# Patient Record
Sex: FEMALE | Race: OTHER | URBAN - METROPOLITAN AREA
[De-identification: names, ages, dates, MRNs, and addresses within clinical notes are randomized per-mention and may not be internally consistent; named-entity substitution may affect disease eponyms.]

---

## 2018-10-07 ENCOUNTER — OFFICE VISIT (OUTPATIENT)
Dept: FAMILY MEDICINE CLINIC | Facility: CLINIC | Age: 43
End: 2018-10-07
Payer: COMMERCIAL

## 2018-10-07 VITALS
HEART RATE: 88 BPM | TEMPERATURE: 98 F | RESPIRATION RATE: 16 BRPM | SYSTOLIC BLOOD PRESSURE: 130 MMHG | DIASTOLIC BLOOD PRESSURE: 80 MMHG | OXYGEN SATURATION: 98 %

## 2018-10-07 DIAGNOSIS — Z23 NEEDS FLU SHOT: ICD-10-CM

## 2018-10-07 DIAGNOSIS — R12 HEARTBURN: Primary | ICD-10-CM

## 2018-10-07 PROCEDURE — 90471 IMMUNIZATION ADMIN: CPT | Performed by: NURSE PRACTITIONER

## 2018-10-07 PROCEDURE — 99202 OFFICE O/P NEW SF 15 MIN: CPT | Performed by: NURSE PRACTITIONER

## 2018-10-07 PROCEDURE — 90686 IIV4 VACC NO PRSV 0.5 ML IM: CPT | Performed by: NURSE PRACTITIONER

## 2018-10-07 NOTE — PROGRESS NOTES
CHIEF COMPLAINT:   Patient presents with:  Flu  Heartburn      HPI:   Johnnie Christy is a 44year old female who presents for complaints of heartburn. Symptoms have been present for several months on and off  months.   Frequency: on and often more often aft ASSESSMENT AND PLAN:   ASSESSMENT:  Heartburn  (primary encounter diagnosis)  Needs flu shot    PLAN: Proper diet discussed. To ED for moderate to severe abdominal pain, dehydration, blood in stool, or new onset of fever.       Discussed heartburn and av · Limit or avoid fatty, fried, and spicy foods, as well as coffee, chocolate, mint, and foods with high acid content such as tomatoes and citrus fruit and juices (orange, grapefruit, lemon). · Don’t eat large meals, especially at night.  Frequent, smaller · An over-the-counter trial of medicine doesn't relieve your symptoms  · Weight loss that can't be explained  · Trouble or pain swallowing  · Frequent vomiting (can’t keep down liquids)  · Blood in the stool or vomit (red or black in color)  · Feeling weak

## 2018-10-07 NOTE — PATIENT INSTRUCTIONS
GERD (Adult)    The esophagus is a tube that carries food from the mouth to the stomach. A valve (the LES, lower esophageal sphincter) at the lower end of the esophagus prevents stomach acid from flowing upward.  When this valve doesn't work properly, sto body (not just your head.) Or, place 4\" blocks under the head of your bed. Or use 2 bed risers under your bedframe. Medicines  If needed, medicines can help relieve the symptoms of GERD and prevent damage to the esophagus.  Discuss a medicine plan with yo

## 2019-10-08 ENCOUNTER — IMMUNIZATION (OUTPATIENT)
Dept: FAMILY MEDICINE CLINIC | Facility: CLINIC | Age: 44
End: 2019-10-08
Payer: COMMERCIAL

## 2019-10-08 DIAGNOSIS — Z23 NEED FOR VACCINATION: ICD-10-CM

## 2019-10-08 PROCEDURE — 90471 IMMUNIZATION ADMIN: CPT | Performed by: PHYSICIAN ASSISTANT

## 2019-10-08 PROCEDURE — 90686 IIV4 VACC NO PRSV 0.5 ML IM: CPT | Performed by: PHYSICIAN ASSISTANT

## 2021-04-27 ENCOUNTER — TELEPHONE (OUTPATIENT)
Dept: FAMILY MEDICINE CLINIC | Facility: CLINIC | Age: 46
End: 2021-04-27

## 2021-04-27 NOTE — TELEPHONE ENCOUNTER
Ivette Forte    Future Appointments   Date Time Provider Kvng Zambrano   4/28/2021 10:15 AM Nena Barker MD [facility]

## 2021-04-27 NOTE — TELEPHONE ENCOUNTER
Okay to wait until appointment tomorrow. Agreed, if pain acutely worsens or she develops any new symptoms, she should go to the ER.

## 2021-04-27 NOTE — TELEPHONE ENCOUNTER
Called patient. For last month there is a slight pain in upper right quadrant. Pain is 1/10. Feels like pulling sensation. Does not hurt with palpation. States her stool has also been greasy and mucusy about once a month.  Recently for a week straight every

## 2021-04-27 NOTE — TELEPHONE ENCOUNTER
NP for Dr Kailyn Whelan, pt has an appt 4/28/21 for \"Px, upper right side pain, fatty stool only in the morning\". Sending to nurse if needs to be triaged.

## 2021-04-28 ENCOUNTER — OFFICE VISIT (OUTPATIENT)
Dept: FAMILY MEDICINE CLINIC | Facility: CLINIC | Age: 46
End: 2021-04-28
Payer: COMMERCIAL

## 2021-04-28 VITALS
OXYGEN SATURATION: 97 % | RESPIRATION RATE: 18 BRPM | SYSTOLIC BLOOD PRESSURE: 130 MMHG | BODY MASS INDEX: 38.04 KG/M2 | TEMPERATURE: 97 F | HEIGHT: 62.5 IN | HEART RATE: 114 BPM | DIASTOLIC BLOOD PRESSURE: 88 MMHG | WEIGHT: 212 LBS

## 2021-04-28 DIAGNOSIS — Z00.00 GENERAL MEDICAL EXAMINATION: Primary | ICD-10-CM

## 2021-04-28 DIAGNOSIS — R10.11 RUQ PAIN: ICD-10-CM

## 2021-04-28 PROCEDURE — 3075F SYST BP GE 130 - 139MM HG: CPT | Performed by: FAMILY MEDICINE

## 2021-04-28 PROCEDURE — 99386 PREV VISIT NEW AGE 40-64: CPT | Performed by: FAMILY MEDICINE

## 2021-04-28 PROCEDURE — 3079F DIAST BP 80-89 MM HG: CPT | Performed by: FAMILY MEDICINE

## 2021-04-28 PROCEDURE — 80050 GENERAL HEALTH PANEL: CPT | Performed by: FAMILY MEDICINE

## 2021-04-28 PROCEDURE — 80061 LIPID PANEL: CPT | Performed by: FAMILY MEDICINE

## 2021-04-28 PROCEDURE — 3008F BODY MASS INDEX DOCD: CPT | Performed by: FAMILY MEDICINE

## 2021-04-28 PROCEDURE — 83036 HEMOGLOBIN GLYCOSYLATED A1C: CPT | Performed by: FAMILY MEDICINE

## 2021-04-28 RX ORDER — FEXOFENADINE HCL 180 MG/1
TABLET ORAL
COMMUNITY

## 2021-04-28 RX ORDER — BECLOMETHASONE DIPROPIONATE HFA 80 UG/1
2 AEROSOL, METERED RESPIRATORY (INHALATION) 2 TIMES DAILY
COMMUNITY
Start: 2021-02-18

## 2021-04-28 NOTE — PROGRESS NOTES
HPI:   Teri Carrillo is a 41year old female who presents for a complete physical exam.   Last px > 5-10 years ago. Kept UTD with gyne appts. Also sees allergist, who also mx asthma. Pap: UTD  Mammogram due.  Prefers to get order from gyne  Has gyne: yes Diagnosis Date   • Allergic rhinitis    • Asthma       History reviewed. No pertinent surgical history.    Family History   Problem Relation Age of Onset   • Diabetes Father    • Cancer Maternal Grandmother 79        breast and colon ca   • Heart Disorder gallstones. Avoid fatty diet. Fasting BW ordered: Lipids, CMP, CBC, TSH and HbA1c.  Pt' s weight is Body mass index is 38.16 kg/m². , recommended low fat/carb diet and aerobic exercise 45 minutes 5 times weekly.   The patient indicates understanding of thes

## 2021-06-28 ENCOUNTER — TELEPHONE (OUTPATIENT)
Dept: FAMILY MEDICINE CLINIC | Facility: CLINIC | Age: 46
End: 2021-06-28

## 2022-11-11 ENCOUNTER — TELEPHONE (OUTPATIENT)
Dept: FAMILY MEDICINE CLINIC | Facility: CLINIC | Age: 47
End: 2022-11-11

## 2022-11-11 NOTE — TELEPHONE ENCOUNTER
I called patient to give her mammogram results. Patient is aware her breast are Dense . Patient will wait for ultrasound next year.

## 2022-11-22 ENCOUNTER — PATIENT OUTREACH (OUTPATIENT)
Dept: FAMILY MEDICINE CLINIC | Facility: CLINIC | Age: 47
End: 2022-11-22

## 2023-04-18 ENCOUNTER — PATIENT OUTREACH (OUTPATIENT)
Dept: FAMILY MEDICINE CLINIC | Facility: CLINIC | Age: 48
End: 2023-04-18

## 2023-11-14 ENCOUNTER — PATIENT OUTREACH (OUTPATIENT)
Dept: CASE MANAGEMENT | Age: 48
End: 2023-11-14

## 2023-11-14 NOTE — PROCEDURES
The office order for PCP removal request is Approved and finalized on November 14, 2023.     Thanks,  [facility] [employer]

## 2023-11-25 ENCOUNTER — TELEPHONE (OUTPATIENT)
Dept: FAMILY MEDICINE CLINIC | Facility: CLINIC | Age: 48
End: 2023-11-25

## 2023-11-25 NOTE — TELEPHONE ENCOUNTER
Patient advised and verbalized understanding. Patient will check with insurance if MBI is covered. She will call us back if she would like to proceed with order.

## 2023-11-25 NOTE — TELEPHONE ENCOUNTER
Please inform I got her mammogram result it shows no malignancy repeat in 1 yr.   They also mention she has dense breast so can benefit with us breast as additional testing if she wish I can place order otherwise do routine yearly mammogram.

## 2024-03-16 ENCOUNTER — OFFICE VISIT (OUTPATIENT)
Dept: FAMILY MEDICINE CLINIC | Facility: CLINIC | Age: 49
End: 2024-03-16
Payer: COMMERCIAL

## 2024-03-16 VITALS
HEART RATE: 108 BPM | RESPIRATION RATE: 16 BRPM | WEIGHT: 214 LBS | BODY MASS INDEX: 39.38 KG/M2 | HEIGHT: 62 IN | SYSTOLIC BLOOD PRESSURE: 130 MMHG | OXYGEN SATURATION: 98 % | TEMPERATURE: 98 F | DIASTOLIC BLOOD PRESSURE: 78 MMHG

## 2024-03-16 DIAGNOSIS — J01.00 ACUTE NON-RECURRENT MAXILLARY SINUSITIS: Primary | ICD-10-CM

## 2024-03-16 PROCEDURE — 99213 OFFICE O/P EST LOW 20 MIN: CPT | Performed by: FAMILY MEDICINE

## 2024-03-16 PROCEDURE — 3078F DIAST BP <80 MM HG: CPT | Performed by: FAMILY MEDICINE

## 2024-03-16 PROCEDURE — 3008F BODY MASS INDEX DOCD: CPT | Performed by: FAMILY MEDICINE

## 2024-03-16 PROCEDURE — 3075F SYST BP GE 130 - 139MM HG: CPT | Performed by: FAMILY MEDICINE

## 2024-03-16 RX ORDER — AMOXICILLIN AND CLAVULANATE POTASSIUM 875; 125 MG/1; MG/1
1 TABLET, FILM COATED ORAL 2 TIMES DAILY
Qty: 14 TABLET | Refills: 0 | Status: SHIPPED | OUTPATIENT
Start: 2024-03-16 | End: 2024-03-23

## 2024-03-16 NOTE — PROGRESS NOTES
Elis Mesa is a 48 year old female.    S:  Patient presents today with the following concerns:  Ear Pain (Few weeks, since covid Ear pain both sides, left side is worse, tooth pain  Covid last month, bad congestion  OTC Sudafed)  Had Covid infection for the 1st time last month.  No N/V/D.    Current Outpatient Medications   Medication Sig Dispense Refill    amoxicillin clavulanate 875-125 MG Oral Tab Take 1 tablet by mouth 2 (two) times daily for 7 days. 14 tablet 0    QVAR REDIHALER 80 MCG/ACT Inhalation Aerosol, Breath Activated Inhale 2 puffs into the lungs 2 (two) times daily. RINSE MOUTH AFTER USE      Fexofenadine HCl (ALLEGRA ALLERGY) 180 MG Oral Tab        There is no problem list on file for this patient.    Family History   Problem Relation Age of Onset    Diabetes Father     Cancer Maternal Grandmother 70        breast and colon ca    Heart Disorder Neg        REVIEW OF SYSTEMS:  GENERAL: feels well otherwise  SKIN: denies any unusual skin lesions  EYES:denies vision change  LUNGS: denies shortness of breath with exertion  CARDIOVASCULAR: denies chest pain on exertion  GI: denies abdominal pain.  No N/V/D/C  : denies dysuria  MUSCULOSKELETAL: denies back pain  NEURO: denies headaches    EXAM:  /78   Pulse 108   Temp 97.8 °F (36.6 °C)   Resp 16   Ht 5' 2\" (1.575 m)   Wt 214 lb (97.1 kg)   LMP 02/17/2024 (Approximate)   SpO2 98%   BMI 39.14 kg/m²   GENERAL: well developed, well nourished,in no apparent distress.  Mood, affect, and behavior are normal.  SKIN: no rashes,no suspicious lesions  HEENT: atraumatic, normocephalic  Left TM is bulging.  No erythema.  Right TM is normal.  Nasal turbinates pale and boggy.  EYES:PERRLA, EOMI  NECK: supple,no adenopathy  LUNGS: CTA, no RRW  CARDIO: RRR without murmur  EXTREMITIES: no edema  NEURO: Oriented times three,cranial nerves are intact,motor and sensory are grossly intact    ASSESSMENT AND PLAN:  Elis Mesa is a 48 year old female.  Encounter  Diagnosis   Name Primary?    Acute non-recurrent maxillary sinusitis Yes       No orders of the defined types were placed in this encounter.    Meds & Refills for this Visit:  Requested Prescriptions     Signed Prescriptions Disp Refills    amoxicillin clavulanate 875-125 MG Oral Tab 14 tablet 0     Sig: Take 1 tablet by mouth 2 (two) times daily for 7 days.     Imaging & Consults:  None    Increase fluids, steam, rest.  Sinus rinses or saline nasal sprays or netti pot.  Tea with honey.   Continue allergy medications.  Warm compresses to the sinuses.  May use Sudafed if no HTN.  Follow up if symptoms worsen or do not improve.   See dentist if tooth pain persists.  Patient verbalizes understanding of treatment plan.      No follow-ups on file.

## 2025-03-22 ENCOUNTER — HOSPITAL ENCOUNTER (OUTPATIENT)
Age: 50
Discharge: HOME | End: 2025-03-22
Payer: COMMERCIAL

## 2025-03-22 ENCOUNTER — APPOINTMENT (OUTPATIENT)
Dept: GENERAL RADIOLOGY | Age: 50
End: 2025-03-22
Attending: PHYSICIAN ASSISTANT
Payer: COMMERCIAL

## 2025-03-22 VITALS
DIASTOLIC BLOOD PRESSURE: 87 MMHG | TEMPERATURE: 98 F | HEART RATE: 91 BPM | OXYGEN SATURATION: 97 % | SYSTOLIC BLOOD PRESSURE: 140 MMHG | RESPIRATION RATE: 18 BRPM

## 2025-03-22 DIAGNOSIS — S60.221A CONTUSION OF DORSUM OF RIGHT HAND: Primary | ICD-10-CM

## 2025-03-22 DIAGNOSIS — S69.90XA HAND INJURY: ICD-10-CM

## 2025-03-22 PROCEDURE — A6449 LT COMPRES BAND >=3" <5"/YD: HCPCS | Performed by: PHYSICIAN ASSISTANT

## 2025-03-22 PROCEDURE — 99213 OFFICE O/P EST LOW 20 MIN: CPT | Performed by: PHYSICIAN ASSISTANT

## 2025-03-22 PROCEDURE — 73130 X-RAY EXAM OF HAND: CPT | Performed by: PHYSICIAN ASSISTANT

## 2025-03-22 RX ORDER — LORATADINE 10 MG/1
10 TABLET ORAL DAILY
COMMUNITY

## 2025-03-22 RX ORDER — BUDESONIDE AND FORMOTEROL FUMARATE DIHYDRATE 80; 4.5 UG/1; UG/1
2 AEROSOL RESPIRATORY (INHALATION) 2 TIMES DAILY
COMMUNITY
Start: 2025-03-14

## 2025-03-22 NOTE — ED INITIAL ASSESSMENT (HPI)
Pt with right hand pain. Pt states she smacked her hand against her entertainment center yesterday. Has been Icing and ibuprofen for pain

## 2025-03-22 NOTE — ED PROVIDER NOTES
Patient Seen in: [facility]      History     Chief Complaint   Patient presents with    Hand Injury     Stated Complaint: hand injury    Subjective:   The history is provided by the patient.         48 yo female with PMH of asthma presents to the IC due to right hand injury which occurred yesterday.  Patient accidentally hit her hand on the corner of her entertainment center.  This caused a shooting pain in the hand on the forearm.  Noticed some significant swelling and bruising.  The swelling has improved but now moved into the wrist.  Isolated tenderness over the right hand but still maintains full range of motion.  Has had intermittent tingling mainly at the area of swelling.  She is not on any blood thinners.  Took ibuprofen and ice with some improvement of her symptoms.  She is right-hand dominant    Objective:     Past Medical History:    Allergic rhinitis    Asthma (HCC)              History reviewed. No pertinent surgical history.             Social History     Socioeconomic History    Marital status:    Tobacco Use    Smoking status: Never     Social Drivers of Health     Food Insecurity: No Food Insecurity (3/7/2025)    Received from [facility]    Food Insecurity     Currently or in the past 3 months, have you worried your food would run out before you had money to buy more?: No     In the past 12 months, have you run out of food or been unable to get more?: No   Transportation Needs: No Transportation Needs (3/7/2025)    Received from [facility]    Transportation Needs     Currently or in the past 3 months, has lack of transportation kept you from medical appointments, getting food or medicine, or providing care to a family member?: Unrecognized value     Medical Transportation Needs?: No    Received from [facility]    Housing Stability              Review of Systems   Constitutional: Negative.    Respiratory: Negative.      Cardiovascular: Negative.    Musculoskeletal:  Negative for joint swelling.       Positive for stated complaint: hand injury  Other systems are as noted in HPI.  Constitutional and vital signs reviewed.      All other systems reviewed and negative except as noted above.    Physical Exam     ED Triage Vitals [03/22/25 0837]   /87   Pulse 91   Resp 18   Temp 98.3 °F (36.8 °C)   Temp src Oral   SpO2 97 %   O2 Device None (Room air)       Current Vitals:   Vital Signs  BP: 140/87  Pulse: 91  Resp: 18  Temp: 98.3 °F (36.8 °C)  Temp src: Oral    Oxygen Therapy  SpO2: 97 %  O2 Device: None (Room air)        Physical Exam  Pulmonary:      Effort: Pulmonary effort is normal.   Musculoskeletal:      Comments: Right elbow no bony tenderness full range of motion.  Right forearm compartments are soft she does have mild tenderness over the dorsal aspect of the wrist.  She has 2+ radial pulse.  No bony tenderness full range of motion.  Right hand has ecchymosis and tenderness over the third and the second metacarpal and third MCP joint.  She has full range of motion.  Her compartments are soft.  Sensation is intact.  2+ capillary refill.   Skin:     General: Skin is warm.      Capillary Refill: Capillary refill takes less than 2 seconds.      Findings: Bruising present.   Neurological:      General: No focal deficit present.      Mental Status: She is oriented to person, place, and time.   Psychiatric:         Mood and Affect: Mood normal.         Behavior: Behavior normal.             ED Course   Labs Reviewed - No data to display         XR HAND (MIN 3 VIEWS), RIGHT (CPT=73130)    Result Date: 3/22/2025  PROCEDURE:  XR HAND (MIN 3 VIEWS), RIGHT (CPT=73130)  TECHNIQUE:  Three views of the right hand were obtained.  COMPARISON:  None.  INDICATIONS:  hand injury  PATIENT STATED HISTORY: (As transcribed by Technologist)  Patient hit right hand on entertainment center yesterday, swelling. Hit posterior 3rd metacarpal, tender  near proximal 3rd metacarpal and radiates up forearm.    FINDINGS:  No acute fractures or osseous lesions are identified.  Phalangeal relationships are within normal limits.  No scapholunate widening.  Radial carpal relationship is normal.               CONCLUSION:  No acute osseous findings.   LOCATION:  [facility]   Dictated by (CST): Sindy Miguel MD on 3/22/2025 at 9:01 AM     Finalized by (CST): Sindy Miguel MD on 3/22/2025 at 9:02 AM             MDM   Ddx -hand contusion, right hand fracture, compartment syndrome    On exam the patient is in no acute distress.  She does have mild edema over the dorsal aspect the wrist but compartments are soft neuro vas intact no bony tenderness.  She has ecchymosis and tenderness over the third and second metacarpals and MCP joint.  CMS is intact.  Compartments are soft.  X-ray confirms no fracture.  This is most likely consistent with contusion/hematoma.  Ace wrap applied.  Will continue conservative treatment and orthopedic follow-up given if symptoms progress or worsen.        Medical Decision Making  Problems Addressed:  Contusion of dorsum of right hand: acute illness or injury  Hand injury: acute illness or injury    Amount and/or Complexity of Data Reviewed  Radiology: ordered and independent interpretation performed. Decision-making details documented in ED Course.    Risk  OTC drugs.        Disposition and Plan     Clinical Impression:  1. Contusion of dorsum of right hand    2. Hand injury         Disposition:  Discharge  3/22/2025  9:11 am    Follow-up:  Radha Douglas PA  [address] [address]  [phone]                Medications Prescribed:  Discharge Medication List as of 3/22/2025  9:13 AM              Supplementary Documentation:

## (undated) NOTE — LETTER
05/28/21        [facility]      Dear Hudson Choi records indicate that you have outstanding testing that was ordered for you and has not yet been completed:           [address] [place] [address] (CPT=76705)    To provide you wi

## (undated) NOTE — LETTER
ASTHMA ACTION PLAN for Patrice Elder     : 1975     Date: 21  Doctor:  Dashawn Santos MD  Phone for doctor or clinic: [address] [address]  [address] [phone]      ACT Score: 19    ACT immediately! If symptoms improve, call office for appointment immediately.     QVAR REDIHALER 80 MCG/ACT Inhalation Aerosol, Breath Activated       Don't forget:  · Rinse mouth after using inhaler  · Use spacer for inhaler  · Remember to get your Flu vaccin